# Patient Record
Sex: MALE | Race: WHITE
[De-identification: names, ages, dates, MRNs, and addresses within clinical notes are randomized per-mention and may not be internally consistent; named-entity substitution may affect disease eponyms.]

---

## 2020-07-07 ENCOUNTER — HOSPITAL ENCOUNTER (EMERGENCY)
Dept: HOSPITAL 62 - ER | Age: 35
Discharge: HOME | End: 2020-07-07
Payer: OTHER GOVERNMENT

## 2020-07-07 VITALS — SYSTOLIC BLOOD PRESSURE: 139 MMHG | DIASTOLIC BLOOD PRESSURE: 80 MMHG
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
## 2020-07-07 DIAGNOSIS — S51.812A: Primary | ICD-10-CM

## 2020-07-07 DIAGNOSIS — Z23: ICD-10-CM
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
## 2020-07-07 PROCEDURE — 90715 TDAP VACCINE 7 YRS/> IM: CPT

## 2020-07-07 PROCEDURE — 90471 IMMUNIZATION ADMIN: CPT

## 2020-07-07 PROCEDURE — 99282 EMERGENCY DEPT VISIT SF MDM: CPT

## 2020-07-07 PROCEDURE — 12002 RPR S/N/AX/GEN/TRNK2.6-7.5CM: CPT

## 2020-07-07 NOTE — ER DOCUMENT REPORT
ED Medical Screen (RME)





- General


Chief Complaint: Laceration


Stated Complaint: FALL/LACERATION


Time Seen by Provider: 07/07/20 16:55





- HPI


Notes: 





07/07/20 16:59


35-year-old male presents to the emergency room for evaluation of a laceration 

to his left forearm from accidentally tripping, landing on the corner of the 

wall with his left forearm approximately 1 hour ago.  Unsure of his last 

tetanus.  Patient is still actively bleeding.  Denies any numbness or tingling 

to his hand or arms.  Denies any other area of injury.  Denies hitting his head 

or change in level consciousness.  Reports pain is 0 out of 5.  Denies any chest

pain or shortness of breath





I have greeted and performed a rapid initial assessment of this patient.  A 

comprehensive ED assessment and evaluation of the patient, analysis of test 

results and completion of the medical decision making process will be conducted 

by additional ED providers.





PHYSICAL EXAMINATION:





GENERAL: Well-appearing, well-nourished and in no acute distress.





CV: s1, s2 regular 





LUNGS: No respiratory distress





Musculoskeletal: Normal range of motion





NEUROLOGICAL:  Normal speech, normal gait. 





SKIN: Warm, Dry, normal turgor, no rashes or lesions noted.  Forearm with approx

imately 8 cm linear laceration along left forearm on lateral aspect.   +2 

in bilateral upper extremities equally.  Full motor and sensory function of 

bilateral upper extremities equally.





Physical Exam





- Vital signs


Vitals: 





                                        











Temp Pulse Resp BP Pulse Ox


 


 98.4 F   84   20   146/94 H  99 


 


 07/07/20 16:22  07/07/20 16:22  07/07/20 16:22  07/07/20 16:22  07/07/20 16:22














Course





- Vital Signs


Vital signs: 





                                        











Temp Pulse Resp BP Pulse Ox


 


 98.4 F   84   20   146/94 H  99 


 


 07/07/20 16:22  07/07/20 16:22  07/07/20 16:22  07/07/20 16:22  07/07/20 16:22

## 2020-07-07 NOTE — ER DOCUMENT REPORT
ED Wound





- General


Chief Complaint: Laceration


Stated Complaint: FALL/LACERATION


Time Seen by Provider: 07/07/20 16:55


Notes: 


Patient is a 35 year old male that comes emergency department for chief 

complaint of laceration to the left proximal forearm just past the elbow.  He 

states that he is remodeling his house and he tripped, landed and struck the 

corner of the wall with his forearm causing a long laceration.  He states that 

he could not stop the bleeding with paper towels at home so he came in for 

evaluation.  He is not up-to-date on his tetanus.  He denies any other injuries 

from the fall.  He denies any other complaints or any daily medications.











- Related Data


Allergies/Adverse Reactions: 


                                        





No Known Allergies Allergy (Unverified 07/07/20 17:02)


   








Home Medications: atorvastatin





Past Medical History





- General


Information source: Patient





- Social History


Smoking Status: Never Smoker


Chew tobacco use (# tins/day): No


Frequency of alcohol use: Rare


Drug Abuse: None


Lives with: Family


Family History: Reviewed & Not Pertinent





- Immunizations


Immunizations up to date: No


Hx Diphtheria, Pertussis, Tetanus Vaccination: Yes





Review of Systems





- Review of Systems


Constitutional: No symptoms reported


EENT: No symptoms reported


Cardiovascular: No symptoms reported


Respiratory: No symptoms reported


Gastrointestinal: No symptoms reported


Genitourinary: No symptoms reported


Male Genitourinary: No symptoms reported


Musculoskeletal: See HPI


Skin: See HPI


Hematologic/Lymphatic: No symptoms reported


Neurological/Psychological: No symptoms reported





Physical Exam





- Vital signs


Vitals: 


                                        











Temp Pulse Resp BP Pulse Ox


 


 98.4 F   84   20   146/94 H  99 


 


 07/07/20 16:22  07/07/20 16:22  07/07/20 16:22  07/07/20 16:22  07/07/20 16:22














- Notes


Notes: 





GENERAL: Alert, interacts well. No acute distress.


HEAD: Normocephalic, atraumatic.


EYES: Pupils equal, round, and reactive to light. Extraocular movements intact.


ENT: Oral mucosa moist, tongue midline. Oropharynx unremarkable. Airway patent.


LUNGS: Clear to auscultation bilaterally, no wheezes, rales, or rhonchi. No 

respiratory distress. Non-tender chest wall. 


HEART: Regular rate and rhythm. No murmur


ABDOMEN: Soft, non-tender. Non-distended. 


EXTREMITIES: There is a large 6.5 cm laceration that is full-thickness, 

vertical, and linear extending from just past the left elbow up the proximal 

forearm over the extensor surface.  There is no bony tenderness, there is normal

range of motion at the elbow, wrist, normal hand, wrist, elbow exam otherwise.  

Normal capillary refill and sensation, normal distal pulses.  Unremarkable 

otherwise.


BACK: no cervical, thoracic, lumbar midline tenderness. No saddle anesthesia, 

normal distal neurovascular exam. Moves all extremities in full range of motion.


NEUROLOGICAL: Alert and oriented x3. Normal speech. Cranial nerves II through 

XII grossly intact. Strength 5/5 in all extremities. 


PSYCH: Normal affect, normal mood.


SKIN: Warm, dry, normal turgor. No rashes or lesions noted.





Course





- Re-evaluation


Re-evalutation: 


Patient does have a large laceration, this was cleaned thoroughly, repaired, 

dressed.  No bony tenderness, no tenderness over the joints, no neurovascular 

deficits, no other injuries noted.  Discussed care, follow-up, return cautions. 

Patient states understanding and agreement.





- Vital Signs


Vital signs: 


                                        











Temp Pulse Resp BP Pulse Ox


 


 97.9 F   84   16   139/80 H  96 


 


 07/07/20 21:17  07/07/20 21:17  07/07/20 21:17  07/07/20 21:17  07/07/20 21:17














Procedures





- Laceration/Wound Repair


  ** Left forearm


Wound length (cm): 6.5


Wound's Depth, Shape: Linear


Laceration pre-procedure: Sterile PPE donned, Sterile drapes applied, Shur-Clens

applied


Anesthetic type: 1% Lidocaine w/epi


Volume Anesthetic (mLs): 8


Wound explored: Clean, No foreign body removed


Irrigated w/ Saline (mLs): 80


Wound Debrided: Minimal


Wound Repaired With: Sutures


Suture Size/Type: 4:0, Ethilon


Number of Sutures: 15 - Mixed simple and vertical mattress


Post-procedure wound care: Sterile dressing applied


Post-procedure NV exam normal: Yes


Complications: No





Discharge





- Discharge


Clinical Impression: 


Laceration of left forearm


Qualifiers:


 Encounter type: initial encounter Qualified Code(s): S51.812A - Laceration 

without foreign body of left forearm, initial encounter





Condition: Stable


Disposition: HOME, SELF-CARE


Instructions:  Tetanus Immunization Given ([facility])


Additional Instructions: 


The wound was repaired with sutures.


Keep clean, clean with soap and water, dab dry, avoid soaking or scrubbing.  You

can apply thin film of topical antibiotic.  


Sutures need to be removed in about 10 days at a medical facility.





Return sooner for any concerning symptoms including signs of infection such as 

developing pain, swelling, redness, discolored discharge, fever, or any other 

concerning symptoms.